# Patient Record
Sex: FEMALE | Race: BLACK OR AFRICAN AMERICAN
[De-identification: names, ages, dates, MRNs, and addresses within clinical notes are randomized per-mention and may not be internally consistent; named-entity substitution may affect disease eponyms.]

---

## 2020-01-09 ENCOUNTER — HOSPITAL ENCOUNTER (OUTPATIENT)
Dept: HOSPITAL 5 - SPVWC | Age: 55
Discharge: HOME | End: 2020-01-09
Attending: FAMILY MEDICINE
Payer: COMMERCIAL

## 2020-01-09 DIAGNOSIS — Z12.31: Primary | ICD-10-CM

## 2020-01-09 PROCEDURE — 77067 SCR MAMMO BI INCL CAD: CPT

## 2020-01-13 NOTE — MAMMOGRAPHY REPORT
DIGITAL SCREENING MAMMOGRAM WITH CAD, 1/9/2020



INDICATION: Baseline screening mammography. 



TECHNIQUE:  Digital bilateral  2D mammography was obtained in the craniocaudal and mediolateral obliq
ue projections. This examination was interpreted with the benefit of Computer-Aided Detection analysi
s.



COMPARISON: None.



FINDINGS: 



Breast Density: The breasts are heterogeneously dense, which may obscure small masses.



Right asymmetries on the CC view or additional imaging. No architectural distortion or suspicious roxnaa
cifications of the right breast. There is no evidence of dominant mass, suspicious calcifications or 
architectural distortion in the left breast.



IMPRESSION: Right asymmetries requiring additional imaging. Recommend recall for right lateral, gisele
d CC and spot compression CC views and right breast ultrasound if needed..





Follow up recommendation: Special View: Spot



Category 0: Incomplete. Needs additional imaging evaluation and/or prior mammograms for comparison.



A "normal" or negative report should not discourage follow up or biopsy of a clinically significant f
inding.



A written summary of these findings will be mailed to the patient. The patient will be entered into a
 mammography reporting system which will generate a reminder letter for the patient's next appointmen
t at the appropriate interval.



The [employer] recommends yearly mammograms starting at age 40 and continuing as l
yi as a woman is in good health.  Breast MRI is recommended for women with an approximate 20-25% or 
greater lifetime risk of breast cancer, including women with a strong family history of breast or ova
santos cancer or who have been treated for Hodgkin's disease.



Signer Name: Shaquille Ross MD 

Signed: 1/13/2020 11:05 AM

 Workstation Name: [id number]

## 2020-02-26 ENCOUNTER — HOSPITAL ENCOUNTER (OUTPATIENT)
Dept: HOSPITAL 5 - SPVWC | Age: 55
Discharge: HOME | End: 2020-02-26
Attending: FAMILY MEDICINE
Payer: COMMERCIAL

## 2020-02-26 DIAGNOSIS — N63.11: Primary | ICD-10-CM

## 2020-02-26 NOTE — MAMMOGRAPHY REPORT
RIGHT DIGITAL DIAGNOSTIC MAMMOGRAM WITH CAD 2/26/2020

RIGHT LIMITED BREAST ULTRASOUND

 

INDICATION: Recall to evaluate asymmetries. F/U abnormal mammogram



TECHNIQUE:  Digital right mammographic imaging was performed. Spot compression views were obtained. T
his examination was interpreted with the benefit of Computer-Aided Detection (CAD) analysis. 



COMPARISON: 1/9/2020



FINDINGS: 



Breast Density: The breast is heterogeneously dense, which may obscure small masses.



MAMMOGRAPHIC FINDINGS: An irregular asymmetry persists on spot compression CC and rolled lateral CC v
iews. It is less apparent on a rolled CC view and is not apparent on MLO or lateral views. 



ULTRASOUND FINDINGS: Targeted ultrasound evaluation was performed of the area of interest.   Ultrasou
nd of the upper right breast was performed and demonstrated an irregular solid hypoechoic shadowing m
ass at 11:30 o'clock 4 cm from the nipple. It measures 6 x 7 x 4 mm and correlates with the mammograp
hic density.



IMPRESSION: A suspicious 7 x 6 x 4 mm right breast mass at 11:30 o'clock 4 cm from the nipple. Recomm
end ultrasound-guided needle biopsy. 



I discussed the findings and the recommendation for ultrasound-guided needle biopsy of the right woody
st with the patient at the time of the exam. 



Follow up recommendation: Biopsy



BI-RADS Category 4: Suspicious for Malignancy. 





A "normal" or negative report should not discourage follow up or biopsy of a clinically significant f
inding.



A written summary of these findings will be mailed to the patient. The patient will be entered into a
 mammography reporting system which will generate a reminder letter for the patient's next appointmen
t at the appropriate interval.



According to the [employer], yearly mammograms are recommended starting at age 40 
and continuing as long as a woman is in good health.  Breast MRI is recommended for women with an kain
roximately 20-25% or greater lifetime risk of breast cancer, including women with a strong family his
tory of breast or ovarian cancer and women who have been treated for Hodgkin's disease.



Signer Name: Shaquille Ross MD 

Signed: 2/26/2020 3:26 PM

 Workstation Name: [id number]

## 2020-02-26 NOTE — ULTRASOUND REPORT
RIGHT DIGITAL DIAGNOSTIC MAMMOGRAM WITH CAD 2/26/2020

RIGHT LIMITED BREAST ULTRASOUND

 

INDICATION: Recall to evaluate asymmetries. F/U abnormal mammogram



TECHNIQUE:  Digital right mammographic imaging was performed. Spot compression views were obtained. T
his examination was interpreted with the benefit of Computer-Aided Detection (CAD) analysis. 



COMPARISON: 1/9/2020



FINDINGS: 



Breast Density: The breast is heterogeneously dense, which may obscure small masses.



MAMMOGRAPHIC FINDINGS: An irregular asymmetry persists on spot compression CC and rolled lateral CC v
iews. It is less apparent on a rolled CC view and is not apparent on MLO or lateral views. 



ULTRASOUND FINDINGS: Targeted ultrasound evaluation was performed of the area of interest.   Ultrasou
nd of the upper right breast was performed and demonstrated an irregular solid hypoechoic shadowing m
ass at 11:30 o'clock 4 cm from the nipple. It measures 6 x 7 x 4 mm and correlates with the mammograp
hic density.



IMPRESSION: A suspicious 7 x 6 x 4 mm right breast mass at 11:30 o'clock 4 cm from the nipple. Recomm
end ultrasound-guided needle biopsy. 



I discussed the findings and the recommendation for ultrasound-guided needle biopsy of the right woody
st with the patient at the time of the exam. 



Follow up recommendation: Biopsy



BI-RADS Category 4: Suspicious for Malignancy. 





A "normal" or negative report should not discourage follow up or biopsy of a clinically significant f
inding.



A written summary of these findings will be mailed to the patient. The patient will be entered into a
 mammography reporting system which will generate a reminder letter for the patient's next appointmen
t at the appropriate interval.



According to the [employer], yearly mammograms are recommended starting at age 40 
and continuing as long as a woman is in good health.  Breast MRI is recommended for women with an kain
roximately 20-25% or greater lifetime risk of breast cancer, including women with a strong family his
tory of breast or ovarian cancer and women who have been treated for Hodgkin's disease.



Signer Name: Shaquille Ross MD 

Signed: 2/26/2020 3:26 PM

 Workstation Name: [id number]

## 2020-03-12 ENCOUNTER — HOSPITAL ENCOUNTER (OUTPATIENT)
Dept: HOSPITAL 5 - SPVWC | Age: 55
Discharge: HOME | End: 2020-03-12
Attending: FAMILY MEDICINE
Payer: COMMERCIAL

## 2020-03-12 DIAGNOSIS — N60.31: ICD-10-CM

## 2020-03-12 DIAGNOSIS — N63.11: Primary | ICD-10-CM

## 2020-03-12 PROCEDURE — 88305 TISSUE EXAM BY PATHOLOGIST: CPT

## 2020-03-12 NOTE — ULTRASOUND REPORT
ULTRASOUND-GUIDED RIGHT BREAST BIOPSY WITH MARKER



HISTORY: Right breast mass.



COMPARISON: 2/26/2020, 1/9/2020.



CONSENT: Technique, risks and alternatives were discussed with the patient and informed written conse
nt obtained.



PROCEDURE:

The mass in the right breast at the 11:30 position, 4 cm from the nipple, was located sonographically
. The overlying skin was cleansed with Betadine.  The skin and superficial soft tissues were anesthet
ized with a small amount of buffered 1% lidocaine.  The deeper soft tissues were anesthetized with bu
ffered 1% lidocaine with epinephrine.  A small dermatotomy was created and a 14-gauge spring-loaded c
ore biopsy needle was advanced to the lesion.



Under real time sonographic guidance, a total of 5 core specimen samples were acquired to submit to s
[facility] pathology for analysis. At the conclusion of the procedure, a SecurMark biopsy marker was dep
loyed within the residual mass.



Manual pressure was applied at the biopsy site to achieve hemostasis. The incision margins were appro
ximated with Steri-Strips. A post procedure mammogram was obtained and demonstrates a biopsy marker i
n the expected location, although a definite mammographic correlate was never identified.



Post procedure care instructions were administered in both verbal and written forms. The patient voic
ed understanding and left the breast center in stable, satisfactory condition.





**IMPRESSION**

 

Technically successful ultrasound-guided biopsy of right breast mass at the 11:30 position with marke
r placement. An addendum will be added to this report once pathology results are available.



Signer Name: Trev Rutherford MD 

Signed: 3/12/2020 2:31 PM

Workstation Name: [id number]

## 2020-03-17 NOTE — MAMMOGRAPHY REPORT
DIGITAL DIAGNOSTIC MAMMOGRAM WITH CAD, 3/12/2020



INDICATION: For clip placement after ultrasound biopsy



TECHNIQUE:  Digital right mammographic imaging was performed.

This examination was interpreted with the benefit of Computer-aided Detection analysis. 



COMPARISON: 1/9/2020



FINDINGS: 



Breast Density: The breasts are heterogeneously dense, which may obscure small masses.



A biopsy clip is identified at 11:30 o'clock and correlates with the ultrasound finding.



IMPRESSION: Concordant clip placement.



Follow up recommendation: No recall.



Post biopsy imaging.



A "normal" or negative report should not discourage follow up or biopsy of a clinically significant f
inding.



A written summary of these findings will be mailed to the patient. The patient will be entered into a
 mammography reporting system which will generate a reminder letter for the patient's next appointmen
t at the appropriate interval.



According to the [employer], yearly mammograms are recommended starting at age 40 
and continuing as long as a woman is in good health.  Breast MRI is recommended for women with an kain
roximately 20-25% or greater lifetime risk of breast cancer, including women with a strong family his
tory of breast or ovarian cancer and women who have been treated for Hodgkin's disease.



Signer Name: Shaquille Ross MD 

Signed: 3/17/2020 3:53 PM

Workstation Name: [id number]

## 2021-03-30 ENCOUNTER — HOSPITAL ENCOUNTER (OUTPATIENT)
Dept: HOSPITAL 5 - SPVWC | Age: 56
Discharge: HOME | End: 2021-03-30
Attending: FAMILY MEDICINE
Payer: COMMERCIAL

## 2021-03-30 DIAGNOSIS — Z12.31: Primary | ICD-10-CM

## 2021-03-30 PROCEDURE — 77067 SCR MAMMO BI INCL CAD: CPT

## 2021-03-30 NOTE — MAMMOGRAPHY REPORT
DIGITAL SCREENING MAMMOGRAM WITH CAD, 3/30/2021



CLINICAL INFORMATION / INDICATION: Routine screening mammography.



TECHNIQUE:  Digital bilateral 2D mammography was obtained in the craniocaudal and mediolateral obliqu
e projections. This examination was interpreted with the benefit of Computer-Aided Detection analysis
.



COMPARISON: 1/9/2020



FINDINGS: 



Breast Density: The breasts are heterogeneously dense, which may obscure small masses.



No dominant mass, suspicious calcifications, or architectural distortion in either breast. 



Biopsy clip is noted in the right breast.

 

IMPRESSION: No mammographic evidence of malignancy.



Follow up recommendation: Routine yearly



BI-RADS Category 2:  Benign.





-------------------------------------------------------------------------------------------

A "normal" or negative report should not discourage follow up or biopsy of a clinically significant f
inding.



A written summary of these findings will be mailed to the patient. The patient will be entered into a
 mammography reporting system which will generate a reminder letter for the patient's next appointmen
t at the appropriate interval.



The [employer] recommends yearly mammograms starting at age 40 and continuing as l
yi as a woman is in good health.  Breast MRI is recommended for women with an approximate 20-25% or 
greater lifetime risk of breast cancer, including women with a strong family history of breast or ova
santos cancer or who have been treated for Hodgkin's disease.



Signer Name: Michelle Reyes MD 

Signed: 3/30/2021 11:04 AM

Workstation Name: [id number]

## 2022-05-11 ENCOUNTER — HOSPITAL ENCOUNTER (OUTPATIENT)
Dept: HOSPITAL 5 - SPVWC | Age: 57
Discharge: HOME | End: 2022-05-11
Attending: FAMILY MEDICINE
Payer: COMMERCIAL

## 2022-05-11 DIAGNOSIS — Z12.31: Primary | ICD-10-CM

## 2022-05-11 PROCEDURE — 77067 SCR MAMMO BI INCL CAD: CPT

## 2022-05-13 NOTE — MAMMOGRAPHY REPORT
DIGITAL SCREENING MAMMOGRAM WITH CAD, 5/11/2022



CLINICAL INFORMATION / INDICATION: Routine screening mammography. SCREENING MAMMO



TECHNIQUE:  Digital bilateral 2D mammography was obtained in the craniocaudal and mediolateral obliqu
e projections. This examination was interpreted with the benefit of Computer-Aided Detection analysis
.



COMPARISON: 1/9/2020 through 3/30/2021.



FINDINGS: 



Breast Density: The breasts are heterogeneously dense, which may obscure small masses.



No dominant mass, suspicious calcifications, or architectural distortion in either breast. 



There is a right biopsy clip.

 

IMPRESSION: No mammographic evidence of malignancy.



Follow up recommendation: Routine yearly screening mammogram.



BI-RADS Category 2:  BENIGN.





-------------------------------------------------------------------------------------------

A "normal" or negative report should not discourage follow up or biopsy of a clinically significant f
inding.



A written summary of these findings will be mailed to the patient. The patient will be entered into a
 mammography reporting system which will generate a reminder letter for the patient's next appointmen
t at the appropriate interval.



The [employer] recommends yearly mammograms starting at age 40 and continuing as l
yi as a woman is in good health.  Breast MRI is recommended for women with an approximate 20-25% or 
greater lifetime risk of breast cancer, including women with a strong family history of breast or ova
santos cancer or who have been treated for Hodgkin's disease.



Signer Name: Geoff Sosa MD 

Signed: 5/13/2022 9:23 AM

Workstation Name: [id number]